# Patient Record
Sex: FEMALE | URBAN - METROPOLITAN AREA
[De-identification: names, ages, dates, MRNs, and addresses within clinical notes are randomized per-mention and may not be internally consistent; named-entity substitution may affect disease eponyms.]

---

## 2022-02-16 ENCOUNTER — APPOINTMENT (RX ONLY)
Dept: URBAN - METROPOLITAN AREA CLINIC 74 | Facility: CLINIC | Age: 42
Setting detail: DERMATOLOGY
End: 2022-02-16

## 2022-02-16 DIAGNOSIS — L30.9 DERMATITIS, UNSPECIFIED: ICD-10-CM

## 2022-02-16 DIAGNOSIS — R23.1 PALLOR: ICD-10-CM

## 2022-02-16 PROCEDURE — ? ORDER TESTS

## 2022-02-16 PROCEDURE — 11104 PUNCH BX SKIN SINGLE LESION: CPT

## 2022-02-16 PROCEDURE — ? COUNSELING

## 2022-02-16 PROCEDURE — ? BIOPSY BY PUNCH METHOD

## 2022-02-16 PROCEDURE — 11105 PUNCH BX SKIN EA SEP/ADDL: CPT

## 2022-02-16 PROCEDURE — ? TREATMENT REGIMEN

## 2022-02-16 PROCEDURE — 99204 OFFICE O/P NEW MOD 45 MIN: CPT | Mod: 25

## 2022-02-16 ASSESSMENT — LOCATION ZONE DERM: LOCATION ZONE: LEG

## 2022-02-16 ASSESSMENT — LOCATION SIMPLE DESCRIPTION DERM
LOCATION SIMPLE: LEFT CALF
LOCATION SIMPLE: LEFT POSTERIOR THIGH

## 2022-02-16 ASSESSMENT — LOCATION DETAILED DESCRIPTION DERM
LOCATION DETAILED: LEFT DISTAL CALF
LOCATION DETAILED: LEFT DISTAL POSTERIOR THIGH

## 2022-02-16 NOTE — PROCEDURE: BIOPSY BY PUNCH METHOD
Detail Level: Detailed
Was A Bandage Applied: Yes
Punch Size In Mm: 4
Biopsy Type: H and E
Anesthesia Type: 1% lidocaine with epinephrine
Anesthesia Volume In Cc (Will Not Render If 0): 0.5
Additional Anesthesia Volume In Cc (Will Not Render If 0): 0
Hemostasis: None
Epidermal Sutures: 4-0 Nylon
Number Of Epidermal Sutures (Optional): 1
Wound Care: Vaseline
Dressing: bandage
Suture Removal: 14 days
Patient Will Remove Sutures At Home?: No
Lab: [id number]
Lab Facility: [id number]
Consent: Written consent was obtained and risks were reviewed including but not limited to scarring, infection, bleeding, scabbing, incomplete removal, nerve damage and allergy to anesthesia.
Post-Care Instructions: I reviewed with the patient in detail post-care instructions. Patient is to keep the biopsy site dry overnight, and then apply bacitracin twice daily until healed. Patient may apply hydrogen peroxide soaks to remove any crusting.
Home Suture Removal Text: Patient was provided a home suture removal kit and will remove their sutures at home.  If they have any questions or difficulties they will call the office.
Notification Instructions: Patient will be notified of biopsy results. However, patient instructed to call the office if not contacted within 2 weeks.
Billing Type: Third-Party Bill
Information: Selecting Yes will display possible errors in your note based on the variables you have selected. This validation is only offered as a suggestion for you. PLEASE NOTE THAT THE VALIDATION TEXT WILL BE REMOVED WHEN YOU FINALIZE YOUR NOTE. IF YOU WANT TO FAX A PRELIMINARY NOTE YOU WILL NEED TO TOGGLE THIS TO 'NO' IF YOU DO NOT WANT IT IN YOUR FAXED NOTE.
Lab: [id number]
Lab Facility: [id number]
Home Suture Removal Text: Patient was provided a home suture removal kit and will remove their sutures at home.  If they have any questions or difficulties they will call the office.
Billing Type: Third-Party Bill

## 2022-02-16 NOTE — PROCEDURE: MIPS QUALITY
Detail Level: Detailed
Quality 130: Documentation Of Current Medications In The Medical Record: Current Medications Documented
Quality 110: Preventive Care And Screening: Influenza Immunization: Influenza Immunization Administered during Influenza season
Additional Notes: Pt received COVID vaccination

## 2022-02-16 NOTE — PROCEDURE: ORDER TESTS
Performing Laboratory: 0
Billing Type: Third-Party Bill
Bill For Surgical Tray: no
Expected Date Of Service: 02/16/2022

## 2022-02-16 NOTE — HPI: BRUISES (PURPURA SIMPLEX)
How Severe Is It?: moderate
Is This A New Presentation, Or A Follow-Up?: Bruises
Additional History: Pt presents for levido reticular is on the left posterior leg x 4 days, for which no tx has been tried. Pt reports dx at ER and advised to consult w/ dermatology for bx prn non-resolving.

## 2022-02-16 NOTE — PROCEDURE: COUNSELING
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
Detail Level: Simple
Detail Level: Detailed
Patient Specific Counseling (Will Not Stick From Patient To Patient): - disc punch bx (r/b/se)

## 2022-03-02 ENCOUNTER — APPOINTMENT (RX ONLY)
Dept: URBAN - METROPOLITAN AREA CLINIC 74 | Facility: CLINIC | Age: 42
Setting detail: DERMATOLOGY
End: 2022-03-02

## 2022-03-02 DIAGNOSIS — L30.8 OTHER SPECIFIED DERMATITIS: ICD-10-CM

## 2022-03-02 DIAGNOSIS — L30.9 DERMATITIS, UNSPECIFIED: ICD-10-CM

## 2022-03-02 DIAGNOSIS — R23.1 PALLOR: ICD-10-CM

## 2022-03-02 PROCEDURE — ? TREATMENT REGIMEN

## 2022-03-02 PROCEDURE — ? COUNSELING

## 2022-03-02 PROCEDURE — 99214 OFFICE O/P EST MOD 30 MIN: CPT

## 2022-03-02 PROCEDURE — ? PRESCRIPTION

## 2022-03-02 PROCEDURE — ? DIAGNOSIS COMMENT

## 2022-03-02 RX ORDER — PENTOXIFYLLINE 400 MG/1
TABLET, EXTENDED RELEASE ORAL
Qty: 60 | Refills: 2 | Status: ERX | COMMUNITY
Start: 2022-03-02

## 2022-03-02 RX ORDER — CLOBETASOL PROPIONATE 0.5 MG/G
CREAM TOPICAL
Qty: 60 | Refills: 1 | Status: ERX | COMMUNITY
Start: 2022-03-02

## 2022-03-02 RX ADMIN — CLOBETASOL PROPIONATE: 0.5 CREAM TOPICAL at 00:00

## 2022-03-02 RX ADMIN — PENTOXIFYLLINE: 400 TABLET, EXTENDED RELEASE ORAL at 00:00

## 2022-03-02 ASSESSMENT — LOCATION DETAILED DESCRIPTION DERM
LOCATION DETAILED: LEFT DISTAL CALF
LOCATION DETAILED: LEFT DISTAL POSTERIOR THIGH

## 2022-03-02 ASSESSMENT — LOCATION SIMPLE DESCRIPTION DERM
LOCATION SIMPLE: LEFT CALF
LOCATION SIMPLE: LEFT POSTERIOR THIGH

## 2022-03-02 ASSESSMENT — LOCATION ZONE DERM: LOCATION ZONE: LEG

## 2022-03-02 NOTE — PROCEDURE: COUNSELING
Patient Specific Counseling (Will Not Stick From Patient To Patient): - disc pentoxifylline (r/b/se)
Detail Level: Simple
Detail Level: Zone
Patient Specific Counseling (Will Not Stick From Patient To Patient): - bx 02/16/2022 proven interface dermatitis which indicates possible underlying connective tissue disease\\n- disc despite negative NIEVES, recommended consult w/ rheumatologist Dr. Rocky Stock due to concern for systemic lupus or anti-phospholipid antibody syndrome due to family hx \\n- pt reports irritation from bandage and no hx of rxn to bandages
Detail Level: Detailed
Patient Specific Counseling (Will Not Stick From Patient To Patient): - disc bx 02/16/2022 path report showed focal dermal sclerosis but not clinically consistent w/ morphea \\n- disc labs 02/16/2022 homocysteine slightly elevated and recommended consult w/ hematologist Dr. Princess Alcaraz\\n- disc no signs of epidermal change noted on exam \\n- disc may be related to smoking and advised to d/c; pt reports attempting to d/c\\n- disc topical steroids (r/b/se) \\n- advised to contact office prn becomes firm/indurated no will consider methotrexate

## 2022-03-02 NOTE — PROCEDURE: DIAGNOSIS COMMENT
Comment: - no signs of vasculopathy or vasculitis on biopsy \\n- interface changes seen, NIEVES negative, father has h/o anti-phospholipid antibody syndrome h/o stroke at a young age\\n- concern for possible lupus with bx findings and may be cause of skin findings, however NIEVES being negative is encourage to it being less likely , will refer to [facility] for evaluation\\n- additionally bx showed sigs of dermal sclerosis, clinically i do not favor morphea as is more suggestive of livedo and she does not have any induration\\n- reviewed with patient early morphea can be a possibility \\n- will start topical clobetasol 0.05% crm and again await rheum eval for workup for connective tissue disease\\n- pt to return sooner  if any worsening
Detail Level: Simple
Render Risk Assessment In Note?: no

## 2022-03-02 NOTE — PROCEDURE: TREATMENT REGIMEN
Continue Regimen: aspirin 325mg- take 1 tablet po qd
Detail Level: Zone
Initiate Treatment: pentoxifylline ER 400mg- take 1 tablet po bid
Initiate Treatment: clobetasol 0.05% cr- apply to affected areas on leg bid x 1 wk, break x 1 wk, repeat cycle prn flares

## 2022-05-03 ENCOUNTER — APPOINTMENT (RX ONLY)
Dept: URBAN - METROPOLITAN AREA CLINIC 74 | Facility: CLINIC | Age: 42
Setting detail: DERMATOLOGY
End: 2022-05-03

## 2022-05-03 DIAGNOSIS — L30.8 OTHER SPECIFIED DERMATITIS: ICD-10-CM | Status: INADEQUATELY CONTROLLED

## 2022-05-03 DIAGNOSIS — R23.1 PALLOR: ICD-10-CM | Status: INADEQUATELY CONTROLLED

## 2022-05-03 DIAGNOSIS — L30.9 DERMATITIS, UNSPECIFIED: ICD-10-CM | Status: INADEQUATELY CONTROLLED

## 2022-05-03 PROCEDURE — 99214 OFFICE O/P EST MOD 30 MIN: CPT

## 2022-05-03 PROCEDURE — ? TREATMENT REGIMEN

## 2022-05-03 PROCEDURE — ? PRESCRIPTION

## 2022-05-03 PROCEDURE — ? COUNSELING

## 2022-05-03 PROCEDURE — ? DIAGNOSIS COMMENT

## 2022-05-03 RX ORDER — CLOBETASOL PROPIONATE 0.5 MG/G
CREAM TOPICAL
Qty: 60 | Refills: 1 | Status: ERX

## 2022-05-03 RX ORDER — PENTOXIFYLLINE 400 MG/1
TABLET, EXTENDED RELEASE ORAL
Qty: 60 | Refills: 2 | Status: ERX

## 2022-05-03 ASSESSMENT — LOCATION DETAILED DESCRIPTION DERM
LOCATION DETAILED: LEFT DISTAL CALF
LOCATION DETAILED: LEFT DISTAL POSTERIOR THIGH

## 2022-05-03 ASSESSMENT — LOCATION SIMPLE DESCRIPTION DERM
LOCATION SIMPLE: LEFT POSTERIOR THIGH
LOCATION SIMPLE: LEFT CALF

## 2022-05-03 ASSESSMENT — LOCATION ZONE DERM: LOCATION ZONE: LEG

## 2022-05-03 NOTE — PROCEDURE: TREATMENT REGIMEN
Continue Regimen: aspirin 325mg- take 1 tablet po qd\\n\\npentoxifylline ER 400mg- take 1 tablet po bid
Detail Level: Zone
Continue Regimen: clobetasol 0.05% cr- apply to affected areas on leg bid x 1 wk, break x 1 wk, repeat cycle prn flares

## 2022-05-03 NOTE — PROCEDURE: DIAGNOSIS COMMENT
Comment: - no signs of vasculopathy or vasculitis on biopsy \\n- interface changes seen, NIEVES negative, father has h/o anti-phospholipid antibody syndrome h/o stroke at a young age\\n- concern for possible lupus with bx findings and may be cause of skin findings, however NIEVES being negative is encourage to it being less likely , will refer to [facility] for evaluation\\n- additionally bx showed sigs of dermal sclerosis, clinically i do not favor morphea as is more suggestive of livedo and she does not have any induration\\n- reviewed with patient early morphea can be a possibility \\n- will start topical clobetasol 0.05% crm and again await rheum eval for workup for connective tissue disease\\n- pt to return sooner  if any worsening
Detail Level: Simple
Render Risk Assessment In Note?: no
Comment: - onset after COVID - 19 infection\\n- improving slowly and now new flares, pt does reports issues with sun sensitivity \\n- continue pentoxifylline 400mg daily \\n- pt has appointment with Rheum next week, will follow up, negative NIEVES, doubt related to lupus as only slight interface changes seen

## 2022-05-03 NOTE — PROCEDURE: COUNSELING
Patient Specific Counseling (Will Not Stick From Patient To Patient): - disc pentoxifylline (r/b/se)\\n- pt reports will see hematologist and endocrinologist next wk\\n- pt reports faded significantly\\n- pt reports developed 2wks after COVID-19 infection \\n- observed better, not spreading \\n- plan to cont pentoxifylline, Will rf
Detail Level: Simple
Detail Level: Zone
Patient Specific Counseling (Will Not Stick From Patient To Patient): - bx 02/16/2022 proven interface dermatitis which indicates possible underlying connective tissue disease\\n- disc despite negative NIEVES, recommended consult w/ rheumatologist Dr. Rocky Stock due to concern for systemic lupus or anti-phospholipid antibody syndrome due to family hx \\n- pt reports area (only L leg) sensitive in sun exposure w/ sunscreen use
Patient Specific Counseling (Will Not Stick From Patient To Patient): - disc bx 02/16/2022 path report showed focal dermal sclerosis but not clinically consistent w/ morphea \\n- disc labs 02/16/2022 homocysteine slightly elevated and recommended consult w/ hematologist Dr. Princess Alcaraz\\n- disc no signs of epidermal change noted on exam \\n- disc may be related to smoking and advised to d/c; pt reports attempting to d/c\\n- disc topical steroids (r/b/se) \\n- advised to contact office prn becomes firm/indurated no will consider methotrexate\\n- plan to cont Clobetasol cr, Will rf

## 2022-11-15 ENCOUNTER — APPOINTMENT (RX ONLY)
Dept: URBAN - METROPOLITAN AREA CLINIC 74 | Facility: CLINIC | Age: 42
Setting detail: DERMATOLOGY
End: 2022-11-15

## 2022-11-15 DIAGNOSIS — R23.1 PALLOR: ICD-10-CM | Status: RESOLVED

## 2022-11-15 PROCEDURE — ? TREATMENT REGIMEN

## 2022-11-15 PROCEDURE — ? COUNSELING

## 2022-11-15 PROCEDURE — 99213 OFFICE O/P EST LOW 20 MIN: CPT

## 2022-11-15 PROCEDURE — ? PATIENT SPECIFIC COUNSELING

## 2022-11-15 ASSESSMENT — LOCATION DETAILED DESCRIPTION DERM
LOCATION DETAILED: LEFT DISTAL POSTERIOR THIGH
LOCATION DETAILED: LEFT DISTAL CALF

## 2022-11-15 ASSESSMENT — LOCATION ZONE DERM: LOCATION ZONE: LEG

## 2022-11-15 ASSESSMENT — LOCATION SIMPLE DESCRIPTION DERM
LOCATION SIMPLE: LEFT CALF
LOCATION SIMPLE: LEFT POSTERIOR THIGH

## 2022-11-15 NOTE — PROCEDURE: PATIENT SPECIFIC COUNSELING
- onset 6 months ago - reports developed 2wks after COVID-19 infection\\n- finished taking pentoxifylline \\n- notes she hasn’t had to use Clobetasol cr recently \\n- pt has appt w/ hematologist and endocrinologist \\n- pt had negative testing for lupus + other autoimmune conditions, counseled likely result of COVID \\n- reports resolved \\n- still taking baby aspirin qd \\n- observed better, resolved on exam - no scarring \\n- counseled on risks of flares w/ COVID booster vs. getting COVID infection again\\n- counseled likely to be worse flare with actual infection \\n- RTC prn
Detail Level: Zone